# Patient Record
Sex: FEMALE | Race: WHITE | Employment: PART TIME | URBAN - METROPOLITAN AREA
[De-identification: names, ages, dates, MRNs, and addresses within clinical notes are randomized per-mention and may not be internally consistent; named-entity substitution may affect disease eponyms.]

---

## 2019-04-05 ENCOUNTER — APPOINTMENT (OUTPATIENT)
Dept: GENERAL RADIOLOGY | Facility: CLINIC | Age: 34
End: 2019-04-05
Attending: EMERGENCY MEDICINE
Payer: COMMERCIAL

## 2019-04-05 ENCOUNTER — HOSPITAL ENCOUNTER (EMERGENCY)
Facility: CLINIC | Age: 34
Discharge: HOME | End: 2019-04-05
Attending: EMERGENCY MEDICINE | Admitting: EMERGENCY MEDICINE
Payer: COMMERCIAL

## 2019-04-05 VITALS
HEIGHT: 62 IN | TEMPERATURE: 98.3 F | SYSTOLIC BLOOD PRESSURE: 148 MMHG | RESPIRATION RATE: 18 BRPM | DIASTOLIC BLOOD PRESSURE: 90 MMHG | WEIGHT: 165 LBS | OXYGEN SATURATION: 100 % | BODY MASS INDEX: 30.36 KG/M2 | HEART RATE: 88 BPM

## 2019-04-05 DIAGNOSIS — S49.91XA SHOULDER INJURY, RIGHT, INITIAL ENCOUNTER: ICD-10-CM

## 2019-04-05 PROCEDURE — 73030 X-RAY EXAM OF SHOULDER: CPT | Mod: RT

## 2019-04-05 PROCEDURE — 25000132 ZZH RX MED GY IP 250 OP 250 PS 637: Performed by: EMERGENCY MEDICINE

## 2019-04-05 PROCEDURE — 99283 EMERGENCY DEPT VISIT LOW MDM: CPT

## 2019-04-05 RX ORDER — OXYCODONE AND ACETAMINOPHEN 5; 325 MG/1; MG/1
1-2 TABLET ORAL EVERY 4 HOURS PRN
Qty: 12 TABLET | Refills: 0 | Status: SHIPPED | OUTPATIENT
Start: 2019-04-05

## 2019-04-05 RX ORDER — OXYCODONE HYDROCHLORIDE 5 MG/1
10 TABLET ORAL ONCE
Status: COMPLETED | OUTPATIENT
Start: 2019-04-05 | End: 2019-04-05

## 2019-04-05 RX ADMIN — OXYCODONE HYDROCHLORIDE 10 MG: 5 TABLET ORAL at 21:50

## 2019-04-05 ASSESSMENT — ENCOUNTER SYMPTOMS
ARTHRALGIAS: 1
NUMBNESS: 1

## 2019-04-05 ASSESSMENT — MIFFLIN-ST. JEOR: SCORE: 1398.75

## 2019-04-05 NOTE — ED AVS SNAPSHOT
Emergency Department  [address] [place] [address]  Phone:  [phone]  Fax:  [phone]                                    Lilian Beard   MRN: [id number]    Department:   Emergency Department   Date of Visit:  4/5/2019           After Visit Summary Signature Page    I have received my discharge instructions, and my questions have been answered. I have discussed any challenges I see with this plan with the nurse or doctor.    ..........................................................................................................................................  Patient/Patient Representative Signature      ..........................................................................................................................................  Patient Representative Print Name and Relationship to Patient    ..................................................               ................................................  Date                                   Time    ..........................................................................................................................................  Reviewed by Signature/Title    ...................................................              ..............................................  Date                                               Time          22EPIC Rev 08/18
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
## 2019-04-06 NOTE — ED PROVIDER NOTES
"  History   Chief Complaint:  Post-op problem    HPI   Lilian Beard is a 33 year old female, with a history of shoulder surgery, who presents with sister to the ED for evaluation of post-op problem. The patient reports moving a fire safe this evening, lost  of the safe and then dropped it on one of the pins in her right shoulder, which pushed one of the pins in. She states she pulled out the pin slightly to make it even with the other one. She notes she has tingling and numbness in her two middle fingers on her right hand as well as her forearm. She also notes that she has been trying to contact her surgeon, but has not heard from him. The patient denies any other acute symptoms or injuries.      Allergies:  Dilaudid    Medications:    The patient is not currently taking any prescribed medications.    Past Medical History:    History reviewed.  No pertinent past medical history.    Past Surgical History:    Shoulder surgery (R)    Family History:    History reviewed. No pertinent family history.     Social History:  Lives in [facility].  Came in to the ED with her sister.    Review of Systems   Musculoskeletal: Positive for arthralgias.        Shoulder pain   Neurological: Positive for numbness.   All other systems reviewed and are negative.    Physical Exam     Patient Vitals for the past 24 hrs:   BP Temp Temp src Pulse Resp SpO2 Height Weight   04/05/19 2026 148/90 98.3  F (36.8  C) Oral 88 18 100 % 1.562 m (5' 1.5\") 74.8 kg (165 lb)     Physical Exam  General: Resting comfortably on the gurney  Eyes:  The pupils are equal and round    Conjunctivae and sclerae are normal  ENT:    The nose is normal    Pinnae are normal  CV:  Regular rate and rhythm     Normal capillary refill in right hand  Resp:  Lungs are clear    Non-labored    No rales    No wheezing   MS:  Normal muscular tone    No asymmetric leg swelling    2 Pins present in right deltoid area without surrounding erythema or discharge  Skin:  No " rash or acute skin lesions noted  Neuro:   Awake, alert.      Speech is normal and fluent.    Face is symmetric.     Moves all extremities    SILT in R/A/M/U distributions of the right arm    Emergency Department Course   Imaging:  Radiographic findings were communicated with the patient who voiced understanding of the findings.    Shoulder XR, 2 views, right:  There are two orthopedic screws projected over the right  humeral head. Two other plates and cerclage wires are projected over  the scapula.    Imaging independently reviewed and agree with radiologist interpretation.      Interventions:  2150: Roxicodone 10 mg PO    Emergency Department Course:  Past medical records, nursing notes, and vitals reviewed.  2126: I performed an exam of the patient and obtained history, as documented above.  The patient was sent for a shoulder x-ray while in the emergency department, findings above.    2237: I rechecked the patient. Explained findings to patient. She is feeling improved after the pain medication.    2249: I spoke to Dr. Quintero on-call for orthopedic surgery.    Findings and plan explained to the Patient. Patient discharged home with instructions regarding supportive care, medications, and reasons to return. The importance of close follow-up was reviewed.     Impression & Plan    Medical Decision Making:  Lilian Beard is a 33 year old female who presents the emergency department with right shoulder pain.  She had pins placed in her shoulder about a month ago after having surgery at the [facility].  Today she had a heavy safe hit her arm that she was getting down from a shelf.  She reports that it moved the pins about an inch or so.  Upon looking at her previous pictures, this does seem approximately accurate.  X-rays obtained and compared to one that she has on her phone and it is difficult to discern any difference.  I discussed the patient with the on-call orthopedic surgeon at the [facility] who  recommended follow-up in clinic next week with her surgeon.  At this time the patient has no evidence of neurovascular compromise.  She is feeling much improved after receiving pain medication here.  She is discharged home and encouraged to return with any new or concerning symptoms.    Diagnosis:    ICD-10-CM   1. Shoulder injury, right, initial encounter S49.91XA     Disposition:  discharged to home.    Discharge Medications:     Medication List      Started    oxyCODONE-acetaminophen 5-325 MG tablet  Commonly known as:  PERCOCET  1-2 tablets, Oral, EVERY 4 HOURS PRN          Reed Ascencio  4/5/2019    EMERGENCY DEPARTMENT  Scribe Disclosure:  IReed, am serving as a scribe at 9:26 PM on 4/5/2019 to document services personally performed by Raimundo Baez MD based on my observations and the provider's statements to me.         Raimundo Baez MD  04/06/19 0113